# Patient Record
(demographics unavailable — no encounter records)

---

## 2024-10-07 NOTE — DISCUSSION/SUMMARY
[FreeTextEntry1] : Impression: Neurologically improved mental status since admission.  Initially presented with decreased level of consciousness found to have fever and likely incidental small R MCA territory posterior frontal infarct on CTH.  Mechanism likely ESUS.    HbA1C6.0 and LDL 59 ILR outpatient

## 2024-10-07 NOTE — HISTORY OF PRESENT ILLNESS
[FreeTextEntry1] : Mr. Riddle is a 93 year old male PMHx CAD, dementia, prostate CA, and DM  93 YO M, from [place] who presented with AMS. Found with metabolic encephalopathy second to perforated gallbladder complicated by hepatic abscess and klebsiella bacteremia (BCx positive on 8/30, 9/1 and 9/3 and cleared on 9/4 and 9/5).  Course further complicated by subacute R MCA infarct in R anterolateral parietal lobe and started on ASA and lipitor. Case discussed with Neuro and plan for outpatient follow up for ILR. Course complicated by anemia on 9/11 without overt signs of bleeding. ASA stopped and 1U PRBC given with stable HH per baseline.

## 2024-10-07 NOTE — PHYSICAL EXAM
[FreeTextEntry1] : General: Constitutional: Male, appears stated age, in no apparent distress including pain Head: Normocephalic & Atraumatic. Respiratory: Breathing comfortably. Extremities: No cyanosis, clubbing, or edema  Neurological: MS: Eyes open, awake, alert, oriented to person, hospital, not age or year.  Follows simple commands.   Language: Speech is mild to moderately dysarthric, fluent with good repetition & comprehension. CNs: BTT b/l. EOMI no nystagmus. V1-3 intact to LT b/l. No facial asymmetry b/l.  Motor: Normal muscle bulk & tone. No noticeable tremor. No UE drifts, B/l LE some effort against gravity.  		  Sensation: Intact to LT b/l throughout.  Cortical: Extinction on DSS (neglect): none Coordination: Deferred.  Gait: Deferred.

## 2024-10-09 NOTE — HISTORY OF PRESENT ILLNESS
[FreeTextEntry1] : Mr. Riddle is a 93 year old male PMHx CAD, dementia, prostate CA, and DM  91 YO M, from [place] who presented with AMS. Found with metabolic encephalopathy second to perforated gallbladder complicated by hepatic abscess and klebsiella bacteremia (BCx positive on 8/30, 9/1 and 9/3 and cleared on 9/4 and 9/5).  Course further complicated by subacute R MCA infarct in R anterolateral parietal lobe and started on ASA and lipitor. Case discussed with Neuro and plan for outpatient follow up for ILR. Course complicated by anemia on 9/11 without overt signs of bleeding. ASA stopped and 1U PRBC given with stable HH per baseline.